# Patient Record
Sex: FEMALE | Race: WHITE | NOT HISPANIC OR LATINO | Employment: STUDENT | URBAN - METROPOLITAN AREA
[De-identification: names, ages, dates, MRNs, and addresses within clinical notes are randomized per-mention and may not be internally consistent; named-entity substitution may affect disease eponyms.]

---
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
## 2023-09-20 ENCOUNTER — OFFICE VISIT (OUTPATIENT)
Dept: FAMILY MEDICINE CLINIC | Facility: CLINIC | Age: 18
End: 2023-09-20

## 2023-09-20 VITALS
OXYGEN SATURATION: 98 % | TEMPERATURE: 98.8 F | WEIGHT: 130 LBS | HEART RATE: 88 BPM | HEIGHT: 62 IN | BODY MASS INDEX: 23.92 KG/M2

## 2023-09-20 DIAGNOSIS — J06.9 VIRAL UPPER RESPIRATORY TRACT INFECTION: Primary | ICD-10-CM

## 2023-09-20 NOTE — PROGRESS NOTES
".Chief Complaint  Sore Throat    Subjective          History of Present Illness  Moira Huitron is here today with a sore throat    Patient states that she started to have a sore and scratchy throat last night.  She states that he continued on it this morning.  She states that she has had some nasal congestion as well.  She states that she feels had some chills but no fever.  She states she had a bit of a cough but denies any headache nausea or discolored drainage.  She states that she does not have any issues with seasonal allergies and has never been diagnosed with asthma or given an inhaler.    Objective   Vital Signs:   Pulse 88   Temp 98.8 °F (37.1 °C)   Ht 157.5 cm (62\")   Wt 59 kg (130 lb)   SpO2 98%   BMI 23.78 kg/m²     Body mass index is 23.78 kg/m².      Review of Systems      Current Outpatient Medications:     FLUoxetine (PROzac) 40 MG capsule, Take 1 capsule by mouth Every Night., Disp: , Rfl:     ascorbic acid (VITAMIN C) 1000 MG tablet, Take 1,000 mg by mouth Daily. (Patient not taking: Reported on 9/20/2023), Disp: , Rfl:     Allergies: Patient has no known allergies.    Physical Exam  Vitals and nursing note reviewed.   Constitutional:       Appearance: Normal appearance.   HENT:      Head: Normocephalic and atraumatic.      Right Ear: Tympanic membrane, ear canal and external ear normal.      Left Ear: Tympanic membrane, ear canal and external ear normal.      Nose: Congestion present.      Mouth/Throat:      Mouth: Mucous membranes are moist.      Comments: Clear post nasal drainage  Eyes:      Pupils: Pupils are equal, round, and reactive to light.   Cardiovascular:      Rate and Rhythm: Normal rate and regular rhythm.      Heart sounds: Normal heart sounds.   Pulmonary:      Effort: Pulmonary effort is normal.      Breath sounds: Normal breath sounds.   Neurological:      General: No focal deficit present.      Mental Status: She is alert.   Psychiatric:         Mood and Affect: Mood normal. "        Result Review :              POC Flu A and B and COVID negative     Assessment and Plan    Diagnoses and all orders for this visit:    1. Viral upper respiratory tract infection (Primary)    Discussed with patient that her flu and COVID test today are both negative.  We also discussed that this may be a false negative as she has been sick less than 24 hours.  In any time we will treat this as though she has an upper respiratory infection and will have her use over-the-counter medications such as Kristi-Franklin or DayQuil or NyQuil to help decrease her symptoms.  If she finds that her cough worsens she has discolored drainage she is to come see our office or be seen in the ED.    Follow Up   No follow-ups on file.  Patient was given instructions and counseling regarding her condition or for health maintenance advice. Please see specific information pulled into the AVS if appropriate.     ALTON Thayer  09/20/2023

## 2023-09-22 ENCOUNTER — OFFICE VISIT (OUTPATIENT)
Dept: FAMILY MEDICINE CLINIC | Facility: CLINIC | Age: 18
End: 2023-09-22

## 2023-09-22 VITALS
TEMPERATURE: 98.8 F | OXYGEN SATURATION: 98 % | BODY MASS INDEX: 23.92 KG/M2 | HEART RATE: 90 BPM | WEIGHT: 130 LBS | HEIGHT: 62 IN

## 2023-09-22 DIAGNOSIS — J06.9 VIRAL UPPER RESPIRATORY TRACT INFECTION: Primary | ICD-10-CM

## 2023-09-22 PROCEDURE — 99213 OFFICE O/P EST LOW 20 MIN: CPT | Performed by: PHYSICIAN ASSISTANT

## 2023-09-22 NOTE — PROGRESS NOTES
".Chief Complaint  Sore Throat    Subjective          History of Present Illness  Moira Huitron is here today with continuing sore throat.  Patient was seen in our clinic 2 days ago with less than 12 hours onset of sore throat.  At that time she tested negative to both strep and COVID.  Discussed that if her symptoms worsened she should consider retesting for COVID.  She states that over the last 2 days she continues to have a sore throat and has developed a fever.  She states she has had more congestion as well as ear congestion.  She has had an occasional cough. She states she is had a slight headache but no nausea vomiting or diarrhea and she denies any discolored drainage.    Objective   Vital Signs:   Pulse 90   Temp 98.8 °F (37.1 °C)   Ht 157.5 cm (62\")   Wt 59 kg (130 lb)   SpO2 98%   BMI 23.78 kg/m²     Body mass index is 23.78 kg/m².      Review of Systems      Current Outpatient Medications:     FLUoxetine (PROzac) 40 MG capsule, Take 1 capsule by mouth Every Night., Disp: , Rfl:     Allergies: Patient has no known allergies.    Physical Exam  Vitals and nursing note reviewed.   Constitutional:       General: She is not in acute distress.     Appearance: Normal appearance. She is normal weight. She is not toxic-appearing or diaphoretic.   HENT:      Head: Normocephalic and atraumatic.      Right Ear: Tympanic membrane, ear canal and external ear normal.      Left Ear: Tympanic membrane, ear canal and external ear normal.      Nose: Congestion present.      Mouth/Throat:      Mouth: Mucous membranes are moist.      Comments: Clear post nasal;l drainage    Eyes:      Pupils: Pupils are equal, round, and reactive to light.   Cardiovascular:      Rate and Rhythm: Normal rate and regular rhythm.      Heart sounds: Normal heart sounds.   Pulmonary:      Effort: Pulmonary effort is normal.      Breath sounds: Normal breath sounds.   Neurological:      General: No focal deficit present.      Mental Status: She " is alert.   Psychiatric:         Mood and Affect: Mood normal.         Behavior: Behavior normal.        Result Review :   Office Visit with Zahra Cavanaugh PA (09/20/2023)            POC strep A , Flu a and B and COVID all negative     Assessment and Plan    Diagnoses and all orders for this visit:    1. Viral upper respiratory tract infection (Primary)    Discussed with patient that her test for strep flu and COVID today are still negative which since she has been sick for a total of 4 days now should be a pretty definitive test.  Discussed that this is a head cold that she is having to deal with and recommend that she continue ibuprofen as needed and DayQuil or NyQuil to help with her symptoms.  If she develops discolored drainage or her cough worsens she is to let our office know.    Follow Up   No follow-ups on file.  Patient was given instructions and counseling regarding her condition or for health maintenance advice. Please see specific information pulled into the AVS if appropriate.     ALTON Thayer  09/22/2023

## 2025-01-05 ENCOUNTER — HOSPITAL ENCOUNTER (EMERGENCY)
Facility: HOSPITAL | Age: 20
Discharge: HOME | End: 2025-01-05
Attending: STUDENT IN AN ORGANIZED HEALTH CARE EDUCATION/TRAINING PROGRAM | Admitting: STUDENT IN AN ORGANIZED HEALTH CARE EDUCATION/TRAINING PROGRAM
Payer: COMMERCIAL

## 2025-01-05 VITALS
DIASTOLIC BLOOD PRESSURE: 58 MMHG | BODY MASS INDEX: 23.92 KG/M2 | HEART RATE: 94 BPM | SYSTOLIC BLOOD PRESSURE: 115 MMHG | TEMPERATURE: 100.8 F | OXYGEN SATURATION: 96 % | WEIGHT: 130 LBS | RESPIRATION RATE: 20 BRPM | HEIGHT: 62 IN

## 2025-01-05 DIAGNOSIS — M79.10 MYALGIA: Primary | ICD-10-CM

## 2025-01-05 DIAGNOSIS — R11.2 NAUSEA AND VOMITING, UNSPECIFIED VOMITING TYPE: ICD-10-CM

## 2025-01-05 DIAGNOSIS — R05.1 ACUTE COUGH: ICD-10-CM

## 2025-01-05 LAB
FLUAV RNA RESP QL NAA+PROBE: NOT DETECTED
FLUBV RNA RESP QL NAA+PROBE: NOT DETECTED
RSV RNA RESP QL NAA+PROBE: NOT DETECTED
SARS-COV-2 RNA RESP QL NAA+PROBE: NOT DETECTED

## 2025-01-05 PROCEDURE — 99283 EMERGENCY DEPT VISIT LOW MDM: CPT

## 2025-01-05 PROCEDURE — 96372 THER/PROPH/DIAG INJ SC/IM: CPT

## 2025-01-05 PROCEDURE — 87637 SARSCOV2&INF A&B&RSV AMP PRB: CPT | Performed by: STUDENT IN AN ORGANIZED HEALTH CARE EDUCATION/TRAINING PROGRAM

## 2025-01-05 PROCEDURE — 25010000002 KETOROLAC TROMETHAMINE PER 15 MG: Performed by: STUDENT IN AN ORGANIZED HEALTH CARE EDUCATION/TRAINING PROGRAM

## 2025-01-05 RX ORDER — KETOROLAC TROMETHAMINE 15 MG/ML
15 INJECTION, SOLUTION INTRAMUSCULAR; INTRAVENOUS ONCE
Status: COMPLETED | OUTPATIENT
Start: 2025-01-05 | End: 2025-01-05

## 2025-01-05 RX ORDER — ACETAMINOPHEN 500 MG
1000 TABLET ORAL ONCE
Status: COMPLETED | OUTPATIENT
Start: 2025-01-05 | End: 2025-01-05

## 2025-01-05 RX ORDER — ONDANSETRON 4 MG/1
4 TABLET, ORALLY DISINTEGRATING ORAL EVERY 8 HOURS PRN
Qty: 15 TABLET | Refills: 0 | Status: SHIPPED | OUTPATIENT
Start: 2025-01-05

## 2025-01-05 RX ADMIN — KETOROLAC TROMETHAMINE 15 MG: 15 INJECTION, SOLUTION INTRAMUSCULAR; INTRAVENOUS at 03:35

## 2025-01-05 RX ADMIN — ACETAMINOPHEN 1000 MG: 500 TABLET ORAL at 03:35

## 2025-01-05 NOTE — FSED PROVIDER NOTE
"Subjective  History of Present Illness:    Patient is a 19-year-old female that presents to the emergency department with mother for fever.  Patient states that she began to feel unwell yesterday.  She had an episode of vomiting.  She has also been having headache, cough, congestion, body aches.  States that tonight, she woke feeling warm so she checked her temperature.  States that she checked it 3 times and it read at 107.  This was a forehead thermometer that she was using.  Due to the fever, she presented to the emergency department.  Denies any known sick contacts.  States that she has intermittently felt sick over the last 3 weeks.  Notes that she does have headaches and history of migraines.      Nurses Notes reviewed and agree, including vitals, allergies, social history and prior medical history.     REVIEW OF SYSTEMS: All systems reviewed and not pertinent unless noted.  Review of Systems    Past Medical History:   Diagnosis Date    Anxiety     Depression        Allergies:    Patient has no known allergies.      Past Surgical History:   Procedure Laterality Date    BILATERAL BREAST REDUCTION Bilateral 5/22/2020    Procedure: BREAST REDUCTION BILATERAL;  Surgeon: SADAF Cutler MD;  Location: [facility];  Service: Plastics;  Laterality: Bilateral;    WISDOM TOOTH EXTRACTION           Social History     Socioeconomic History    Marital status: Single   Tobacco Use    Smoking status: Never    Smokeless tobacco: Never   Substance and Sexual Activity    Alcohol use: Never    Drug use: Never    Sexual activity: Not Currently         Family History   Problem Relation Age of Onset    Malig Hyperthermia Neg Hx        Objective  Physical Exam:  /58   Pulse 94   Temp (!) 100.8 °F (38.2 °C) (Oral)   Resp 20   Ht 157.5 cm (62\")   Wt 59 kg (130 lb)   LMP 12/25/2024 (Exact Date)   SpO2 96%   BMI 23.78 kg/m²      Physical Exam  HENT:      Head: Normocephalic and atraumatic.   Eyes:      Extraocular " Movements: Extraocular movements intact.      Conjunctiva/sclera: Conjunctivae normal.      Pupils: Pupils are equal, round, and reactive to light.   Cardiovascular:      Rate and Rhythm: Normal rate.   Pulmonary:      Effort: Pulmonary effort is normal.      Breath sounds: Normal breath sounds.   Abdominal:      Palpations: Abdomen is soft.      Tenderness: There is no abdominal tenderness.   Musculoskeletal:         General: Normal range of motion.      Cervical back: Normal range of motion. No rigidity or tenderness.   Neurological:      General: No focal deficit present.      Mental Status: She is alert.         Procedures    ED Course:         Lab Results (last 24 hours)       Procedure Component Value Units Date/Time    COVID-19, FLU A/B, RSV PCR 1 HR TAT - Swab, Nasopharynx [[id number]]  (Normal) Collected: 01/05/25 0337    Specimen: Swab from Nasopharynx Updated: 01/05/25 0424     COVID19 Not Detected     Influenza A PCR Not Detected     Influenza B PCR Not Detected     RSV, PCR Not Detected    Narrative:      Fact sheet for providers: https://www.fda.gov/media/940584/download    Fact sheet for patients: https://www.fda.gov/media/870943/download    Test performed by PCR.             No radiology results from the last 24 hrs       MDM  Number of Diagnoses or Management Options  Acute cough  Myalgia  Nausea and vomiting, unspecified vomiting type  Diagnosis management comments: 19-year-old female presenting with fever, headache, cough, body aches.  On initial presentation, patient is overall well-appearing and in no significant distress.  She was concerned about 107 fever at home but this was taken by forehead thermometer.  Here, her temperature is 100.7.  Neurological exam is normal and there are no focal deficits.  Pupils equal and reactive.  She has no nuchal rigidity and no meningismus signs.  Lungs are clear to auscultation.  Will give dose of Toradol and Tylenol.  Swabs were negative.  Based on history  and exam, I do suspect viral etiology however.  I do have much lower suspicion for sepsis, meningitis, bacterial pneumonia, or other emergent causes for this presentation.  I do believe patient will be safe for discharge.        Clinical information was obtained from an independent historian. History was obtained or confirmed from: mother      Prescription medication was considered, but ultimately not given after discussion with patient/family: Considered antibiotic therapy, however, most likely etiology is viral, therefore will defer.      Medications   ketorolac (TORADOL) injection 15 mg (15 mg Intramuscular Given 1/5/25 0335)   acetaminophen (TYLENOL) tablet 1,000 mg (1,000 mg Oral Given 1/5/25 0335)       -----  ED Disposition       ED Disposition   Discharge    Condition   Stable    Comment   --             Final diagnoses:   Myalgia   Acute cough   Nausea and vomiting, unspecified vomiting type      Your Follow-Up Providers       Yesenia Agosto MD.    Specialty: Pediatrics  [address] [place] [address] [address]  [place] [id number] [phone]                       Contact information for after-discharge care    Follow-up information has not been specified.                    Your medication list        START taking these medications        Instructions Last Dose Given Next Dose Due   ondansetron ODT 4 MG disintegrating tablet  Commonly known as: ZOFRAN-ODT      Place 1 tablet on the tongue Every 8 (Eight) Hours As Needed for Nausea or Vomiting.              CONTINUE taking these medications        Instructions Last Dose Given Next Dose Due   FLUoxetine 40 MG capsule  Commonly known as: PROzac      Take 1 capsule by mouth Every Night.                 Where to Get Your Medications        These medications were sent to [id number] - [place], [place] - [address]  [phone] [facility] [phone] FX  [address] [facility] [address]      Phone:  [phone]   ondansetron ODT 4 MG disintegrating tablet

## 2025-02-21 ENCOUNTER — OFFICE VISIT (OUTPATIENT)
Dept: FAMILY MEDICINE CLINIC | Facility: CLINIC | Age: 20
End: 2025-02-21

## 2025-02-21 VITALS
WEIGHT: 130 LBS | SYSTOLIC BLOOD PRESSURE: 112 MMHG | HEIGHT: 62 IN | OXYGEN SATURATION: 98 % | HEART RATE: 91 BPM | DIASTOLIC BLOOD PRESSURE: 64 MMHG | BODY MASS INDEX: 23.92 KG/M2

## 2025-02-21 DIAGNOSIS — J10.1 INFLUENZA A: Primary | ICD-10-CM

## 2025-02-21 RX ORDER — OSELTAMIVIR PHOSPHATE 75 MG/1
75 CAPSULE ORAL 2 TIMES DAILY
Qty: 10 CAPSULE | Refills: 0 | Status: SHIPPED | OUTPATIENT
Start: 2025-02-21 | End: 2025-02-26

## 2025-02-21 NOTE — LETTER
February 21, 2025     Patient: Moira Huitron   YOB: 2005   Date of Visit: 2/21/2025       To Whom it May Concern:    Moira Huitron was seen in my clinic on 2/21/2025. She has been diagnosed with Flu A. She is to be excused from classes and tutoring from 2-21-25 through 2-26-25.        Sincerely,          Zahra Cavanaugh PA-C        CC: No Recipients

## 2025-02-21 NOTE — PROGRESS NOTES
".Chief Complaint  Influenza (EXPOSED)    Subjective          History of Present Illness  Moira Huitron is here today with her  History of Present Illness  The patient presents for evaluation of influenza.    She reports the onset of her illness was last night, characterized by symptoms of cough, rhinorrhea, and pharyngeal discomfort. She also mentions a slight elevation in temperature but has not experienced any myalgia. She has been maintaining distance from her parents, who have been ill since Sunday with the flu, but acknowledges the high probability of aracely the illness due to their shared living environment. She has not yet initiated any pharmacological interventions for her symptoms. She confirms receiving her influenza vaccine this year. She has no prior history of asthma or the use of inhalers.    IMMUNIZATIONS  She has received her influenza vaccine this year.    Objective   Vital Signs:   /64 (BP Location: Left arm, Patient Position: Sitting, Cuff Size: Adult)   Pulse 91   Ht 157.5 cm (62\")   Wt 59 kg (130 lb)   SpO2 98%   BMI 23.78 kg/m²     Body mass index is 23.78 kg/m².  BMI is within normal parameters. No other follow-up for BMI required.      Review of Systems      Current Outpatient Medications:   •  FLUoxetine (PROzac) 40 MG capsule, Take 1 capsule by mouth Every Night., Disp: , Rfl:   •  oseltamivir (Tamiflu) 75 MG capsule, Take 1 capsule by mouth 2 (Two) Times a Day for 5 days., Disp: 10 capsule, Rfl: 0    Allergies: Patient has no known allergies.    Physical Exam  Vitals and nursing note reviewed.   Constitutional:       General: She is not in acute distress.     Appearance: Normal appearance. She is normal weight. She is not ill-appearing, toxic-appearing or diaphoretic.   HENT:      Head: Normocephalic and atraumatic.      Right Ear: Tympanic membrane, ear canal and external ear normal.      Left Ear: Tympanic membrane, ear canal and external ear normal.      Nose: Congestion " present.      Mouth/Throat:      Mouth: Mucous membranes are moist.   Eyes:      Pupils: Pupils are equal, round, and reactive to light.   Cardiovascular:      Rate and Rhythm: Normal rate and regular rhythm.      Heart sounds: Normal heart sounds.   Pulmonary:      Effort: Pulmonary effort is normal.      Breath sounds: Normal breath sounds.   Neurological:      General: No focal deficit present.      Mental Status: She is alert.   Psychiatric:         Mood and Affect: Mood normal.         Behavior: Behavior normal.      Physical Exam      Result Review :          Results  Laboratory Studies  Influenza A test positive.             Assessment and Plan    Diagnoses and all orders for this visit:    1. Influenza A (Primary)    -     oseltamivir (Tamiflu) 75 MG capsule; Take 1 capsule by mouth 2 (Two) Times a Day for 5 days.  Dispense: 10 capsule; Refill: 0      Assessment & Plan    She has been experiencing symptoms such as cough, runny nose, scratchy throat, and mild fever since last night. Her parents have been sick with similar symptoms for about a week. She has no history of asthma or the use of inhalers. She has received her influenza vaccine this year. A prescription for Tamiflu 75 mg, to be taken twice daily, has been sent to the [facility]. She is advised to start taking DayQuil, NyQuil, and ibuprofen to manage her symptoms. A note excusing her from class until next Wednesday has been provided. If her symptoms worsen she is to return to clinic      Follow Up   No follow-ups on file.  Patient was given instructions and counseling regarding her condition or for health maintenance advice. Please see specific information pulled into the AVS if appropriate.     Patient or patient representative verbalized consent for the use of Ambient Listening during the visit with  ALTON Thayer for chart documentation. 2/21/2025  14:58 EST    ALTON Thayer  02/21/2025

## 2025-08-19 ENCOUNTER — TRANSCRIBE ORDERS (OUTPATIENT)
Dept: ADMINISTRATIVE | Facility: HOSPITAL | Age: 20
End: 2025-08-19
Payer: COMMERCIAL

## 2025-08-19 ENCOUNTER — TELEPHONE (OUTPATIENT)
Dept: FAMILY MEDICINE CLINIC | Facility: CLINIC | Age: 20
End: 2025-08-19
Payer: COMMERCIAL

## 2025-08-19 DIAGNOSIS — G43.011 INTRACTABLE MIGRAINE WITHOUT AURA WITH STATUS MIGRAINOSUS: Primary | ICD-10-CM

## 2025-08-27 ENCOUNTER — HOSPITAL ENCOUNTER (OUTPATIENT)
Facility: HOSPITAL | Age: 20
Discharge: HOME | End: 2025-08-27
Admitting: INTERNAL MEDICINE
Payer: COMMERCIAL

## 2025-08-27 DIAGNOSIS — G43.011 INTRACTABLE MIGRAINE WITHOUT AURA WITH STATUS MIGRAINOSUS: ICD-10-CM

## 2025-08-27 PROCEDURE — 70551 MRI BRAIN STEM W/O DYE: CPT

## (undated) DEVICE — APPL CHLORAPREP HI/LITE 26ML ORNG

## (undated) DEVICE — SKIN AFFIX SURG ADHESIVE 72/CS 0.55ML: Brand: MEDLINE

## (undated) DEVICE — JACKSON-PRATT 100CC BULB RESERVOIR: Brand: CARDINAL HEALTH

## (undated) DEVICE — SUT ETHLN 3/0 PS1 18IN 1663H

## (undated) DEVICE — PANTY KNIT WASHABLE LG/XL BRN/GRN LF

## (undated) DEVICE — EXTRCT STPL SKIN STRL BX/12

## (undated) DEVICE — Device

## (undated) DEVICE — 1010 S-DRAPE TOWEL DRAPE 10/BX: Brand: STERI-DRAPE™

## (undated) DEVICE — ANTIBACTERIAL UNDYED BRAIDED (POLYGLACTIN 910), SYNTHETIC ABSORBABLE SUTURE: Brand: COATED VICRYL

## (undated) DEVICE — PENCL E/S HNDSWTCH SMOKEEVAC HOLSTR 10FT

## (undated) DEVICE — INTENDED FOR TISSUE SEPARATION, AND OTHER PROCEDURES THAT REQUIRE A SHARP SURGICAL BLADE TO PUNCTURE OR CUT.: Brand: BARD-PARKER ® CARBON RIB-BACK BLADES

## (undated) DEVICE — ADHS SKIN DERMABOND TOP ADVANCED

## (undated) DEVICE — STPLR SKIN VISISTAT WD 35CT

## (undated) DEVICE — SYS ENSING FLUID M/ ADD MAC1001

## (undated) DEVICE — UNDYED MONOFILAMENT (POLYDIOXANONE), ABSORBABLE SYNTHETIC SURGICAL SUTURE: Brand: PDS

## (undated) DEVICE — TOTAL TRAY, 16FR 10ML SIL FOLEY, URN: Brand: MEDLINE

## (undated) DEVICE — ELECTRD BLD EDGE/INSUL1P 2.4X5.1MM STRL

## (undated) DEVICE — SPNG GZ WOVN 4X4IN 12PLY 10/BX STRL

## (undated) DEVICE — NEEDLE, QUINCKE, 20GX3.5": Brand: MEDLINE

## (undated) DEVICE — GLV SURG PREMIERPRO ORTHO LTX PF SZ8.5 BRN

## (undated) DEVICE — MARKR SKIN W/RULR AND LBL

## (undated) DEVICE — SUT MNCRYL 3/0 PS2 18IN MCP497G

## (undated) DEVICE — 3M™ STERI-STRIP™ REINFORCED ADHESIVE SKIN CLOSURES, R1547, 1/2 IN X 4 IN (12 MM X 100 MM), 6 STRIPS/ENVELOPE: Brand: 3M™ STERI-STRIP™

## (undated) DEVICE — SYR LUERLOK 50ML

## (undated) DEVICE — PK UNIV COMPL 40

## (undated) DEVICE — CONTAINER,SPECIMEN,OR STERILE,4OZ: Brand: MEDLINE

## (undated) DEVICE — PAD,ABDOMINAL,8"X10",ST,LF: Brand: MEDLINE

## (undated) DEVICE — Device: Brand: FABCO